# Patient Record
Sex: MALE | Race: WHITE | NOT HISPANIC OR LATINO | URBAN - METROPOLITAN AREA
[De-identification: names, ages, dates, MRNs, and addresses within clinical notes are randomized per-mention and may not be internally consistent; named-entity substitution may affect disease eponyms.]

---

## 2017-06-23 ENCOUNTER — EMERGENCY (EMERGENCY)
Facility: HOSPITAL | Age: 32
LOS: 1 days | Discharge: HOME | End: 2017-06-23
Attending: EMERGENCY MEDICINE | Admitting: EMERGENCY MEDICINE
Payer: COMMERCIAL

## 2017-06-23 VITALS
HEART RATE: 71 BPM | RESPIRATION RATE: 18 BRPM | SYSTOLIC BLOOD PRESSURE: 142 MMHG | DIASTOLIC BLOOD PRESSURE: 93 MMHG | OXYGEN SATURATION: 98 % | TEMPERATURE: 97 F

## 2017-06-23 DIAGNOSIS — Z79.899 OTHER LONG TERM (CURRENT) DRUG THERAPY: ICD-10-CM

## 2017-06-23 DIAGNOSIS — M79.644 PAIN IN RIGHT FINGER(S): ICD-10-CM

## 2017-06-23 DIAGNOSIS — L03.011 CELLULITIS OF RIGHT FINGER: ICD-10-CM

## 2017-06-23 PROCEDURE — 99284 EMERGENCY DEPT VISIT MOD MDM: CPT | Mod: 25

## 2017-06-23 RX ORDER — CEPHALEXIN 500 MG
500 CAPSULE ORAL ONCE
Qty: 0 | Refills: 0 | Status: COMPLETED | OUTPATIENT
Start: 2017-06-23 | End: 2017-06-23

## 2017-06-23 RX ORDER — CEPHALEXIN 500 MG
1 CAPSULE ORAL
Qty: 21 | Refills: 0 | OUTPATIENT
Start: 2017-06-23 | End: 2017-06-30

## 2017-06-23 RX ORDER — CETIRIZINE HYDROCHLORIDE 10 MG/1
0 TABLET ORAL
Qty: 0 | Refills: 0 | COMMUNITY

## 2017-06-23 RX ADMIN — Medication 500 MILLIGRAM(S): at 06:26

## 2017-06-23 NOTE — ED PROVIDER NOTE - OBJECTIVE STATEMENT
5th digit splint 2 days ago, pus today 2 days ago pt got small splinter under fingernail. Today noticed pus come out from under nail. No fever. No other trauma.

## 2017-06-23 NOTE — ED PROVIDER NOTE - CHIEF COMPLAINT
The patient is a 32y Male complaining of pain, finger. The patient is a 32y Male complaining of pain, right 5th finger.

## 2017-06-23 NOTE — ED PROCEDURE NOTE - PROCEDURE ADDITIONAL DETAILS
Focused soft tissue ultrasound of distal right 5th digit  Indication: erythema r/o abscess  No anechoic focus. No foreign body.  Impression: No abscess of finger tip. No evidence of foreign body.  LILLI

## 2017-06-23 NOTE — ED PROVIDER NOTE - SKIN, MLM
Skin normal color for race, warm, dry and intact. Redness right 5th digit fingertip without fluctuance or pus.

## 2017-06-23 NOTE — ED ADULT NURSE NOTE - OBJECTIVE STATEMENT
Pt presents to ED awake, alert and ambulatory, c/o right 5th finger redness, swelling and white discharge. Pt states he thinks he got a splinter yesterday and since has had increased swelling and pain. Denies fever or PMH. Pt states his sister is getting  tomorrow and he was worried that his finger would get worse. Slight redness and swelling noted to distal end of finger surrounding nailbed. No discharge noted at this time.

## 2018-01-13 ENCOUNTER — EMERGENCY (EMERGENCY)
Facility: HOSPITAL | Age: 33
LOS: 1 days | Discharge: HOME | End: 2018-01-13
Attending: EMERGENCY MEDICINE | Admitting: EMERGENCY MEDICINE
Payer: COMMERCIAL

## 2018-01-13 VITALS
TEMPERATURE: 98 F | WEIGHT: 209.44 LBS | SYSTOLIC BLOOD PRESSURE: 151 MMHG | RESPIRATION RATE: 18 BRPM | HEIGHT: 74 IN | HEART RATE: 80 BPM | OXYGEN SATURATION: 99 % | DIASTOLIC BLOOD PRESSURE: 99 MMHG

## 2018-01-13 DIAGNOSIS — S52.121A DISPLACED FRACTURE OF HEAD OF RIGHT RADIUS, INITIAL ENCOUNTER FOR CLOSED FRACTURE: ICD-10-CM

## 2018-01-13 DIAGNOSIS — S09.90XA UNSPECIFIED INJURY OF HEAD, INITIAL ENCOUNTER: ICD-10-CM
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
## 2018-01-13 DIAGNOSIS — M25.521 PAIN IN RIGHT ELBOW: ICD-10-CM
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
## 2018-01-13 DIAGNOSIS — Z79.899 OTHER LONG TERM (CURRENT) DRUG THERAPY: ICD-10-CM
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
## 2018-01-13 DIAGNOSIS — Z79.2 LONG TERM (CURRENT) USE OF ANTIBIOTICS: ICD-10-CM

## 2018-01-13 PROCEDURE — 73502 X-RAY EXAM HIP UNI 2-3 VIEWS: CPT | Mod: 26

## 2018-01-13 PROCEDURE — 73080 X-RAY EXAM OF ELBOW: CPT | Mod: 26,RT

## 2018-01-13 PROCEDURE — 73080 X-RAY EXAM OF ELBOW: CPT | Mod: 26

## 2018-01-13 PROCEDURE — 99285 EMERGENCY DEPT VISIT HI MDM: CPT | Mod: 25

## 2018-01-13 PROCEDURE — 73502 X-RAY EXAM HIP UNI 2-3 VIEWS: CPT | Mod: 26,RT

## 2018-01-13 RX ORDER — IBUPROFEN 200 MG
600 TABLET ORAL ONCE
Qty: 0 | Refills: 0 | Status: COMPLETED | OUTPATIENT
Start: 2018-01-13 | End: 2018-01-13

## 2018-01-13 RX ADMIN — Medication 600 MILLIGRAM(S): at 23:23

## 2018-01-13 NOTE — ED PROVIDER NOTE - PROGRESS NOTE DETAILS
troy: pt received at sign out from dr katz as pending ct head /orbits then dc home - scans neg, pt w/radial head fx and sling in place by dr katz pt placed in sling for radial head fx  x 3-4 weeks-- ortho dr epperson for follow up  3-5 days
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
## 2018-01-13 NOTE — ED PROVIDER NOTE - SHIFT CHANGE DETAILS
await CT head /orbits-- if neg may dc   head injury prec  ortho follow up to dr epperson in 2-3 days- right radial head fx
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
## 2018-01-13 NOTE — ED PROVIDER NOTE - ENMT, MLM
Airway patent, Nasal mucosa clear. Mouth with normal mucosa. Throat has no vesicles, no oropharyngeal exudates and uvula is midline.  pos right inf orbital swelling inf eccyhmosis

## 2018-01-13 NOTE — ED ADULT NURSE NOTE - CHPI ED SYMPTOMS NEG
no decreased eating/drinking/no nausea/no tingling/no dizziness/no numbness/no chills/no vomiting/no weakness/no fever

## 2018-01-14 VITALS
OXYGEN SATURATION: 99 % | HEART RATE: 75 BPM | TEMPERATURE: 98 F | DIASTOLIC BLOOD PRESSURE: 90 MMHG | SYSTOLIC BLOOD PRESSURE: 140 MMHG | RESPIRATION RATE: 18 BRPM

## 2018-01-14 PROCEDURE — 70450 CT HEAD/BRAIN W/O DYE: CPT | Mod: 26

## 2018-01-14 PROCEDURE — 70480 CT ORBIT/EAR/FOSSA W/O DYE: CPT | Mod: 26,59

## 2018-01-14 PROCEDURE — 70450 CT HEAD/BRAIN W/O DYE: CPT

## 2018-01-14 PROCEDURE — 99284 EMERGENCY DEPT VISIT MOD MDM: CPT | Mod: 25

## 2018-01-14 PROCEDURE — 73502 X-RAY EXAM HIP UNI 2-3 VIEWS: CPT

## 2018-01-14 PROCEDURE — 73080 X-RAY EXAM OF ELBOW: CPT

## 2018-01-14 PROCEDURE — 24650 CLTX RDL HEAD/NCK FX WO MNPJ: CPT

## 2018-01-14 PROCEDURE — 70480 CT ORBIT/EAR/FOSSA W/O DYE: CPT

## 2018-01-14 RX ORDER — OXYCODONE AND ACETAMINOPHEN 5; 325 MG/1; MG/1
1 TABLET ORAL ONCE
Qty: 0 | Refills: 0 | Status: DISCONTINUED | OUTPATIENT
Start: 2018-01-14 | End: 2018-01-14

## 2018-01-14 RX ADMIN — OXYCODONE AND ACETAMINOPHEN 1 TABLET(S): 5; 325 TABLET ORAL at 02:07

## 2018-01-14 RX ADMIN — Medication 600 MILLIGRAM(S): at 00:00

## 2018-01-14 NOTE — ED ADULT NURSE REASSESSMENT NOTE - NS ED NURSE REASSESS COMMENT FT1
ALENA Hook at bedside to review CT results and follow up instructions with patient. pt requesting pain medication prior to dc home

## 2018-12-20 NOTE — ED PROVIDER NOTE - CARE PLAN
Pt comes to clinic for follow up anticoagulation visit. Last INR on 12/10 was 3.0. Dose maintained per protocol. Today's INR is 2.2 and is within goal range. Dose maintained per protocol. Teaching: INR result/instructions reviewed with patient. Discussed with patient and will switch to 2.5 mg tablets with next refill for ease of dose adjustments. Dosage reminder sheet given with mg instead of # of pills to help patient transition. Patient was instructed to contact the [facility] with any unusual bleeding or bruising, any changes in medications or over the counter medications, start of antibiotics, changes in diet or health status, any acute illnesses, and if there are any other questions or concerns. Patient verbalized understanding of above.    Warfarin refilled approved per protocol. Switching to 2.5 mg tablets instead of 5 mg tablets for ease of dose adjustments. Prescription eprescribed to [facility] pharmacy. Patient is compliant with INR blood tests as ordered. Last OV with PCP 11/9/18. Current dose of coumadin is 5 mg 3 days a week and 2.5 mg 4 days a week.   
Principal Discharge DX:	Radial head fracture, closed  Secondary Diagnosis:	Closed head injury

## 2019-03-30 NOTE — ED PROVIDER NOTE - NS ED MD DISPO DISCHARGE CCDA
April 1, 2019      Jose Canales  [address] [place]  [place] [facility] [facility] [address]        Dear ,    We are writing to inform you of your test results.      The results from your recent labs have all returned. Your blood sugar is normal.     Your cholesterol is higher than ideal, but not in the range where medication is needed. Improved diet and exercise changes where possible and rechecking this in 1 year is what I recommended. A low fat, carbohydrate-controlled diet should be adopted. Saturated fat should not make up more than 7% of your total daily calories, carbohydrates should be restricted to 50% to 60% of daily calories, and simple sugars like sucrose should be avoided. You may also consider increasing your intake of oily fish by either an increase in your fish intake or a fish oil supplement to at least 2 servings per week. Alcohol should also be limited and at least 30 minutes of aerobic exercise 5 days a week would be beneficial to implement.     Resulted Orders   Lipid panel reflex to direct LDL Fasting   Result Value Ref Range    Cholesterol 175 <200 mg/dL    Triglycerides 140 <150 mg/dL      Comment:      Fasting specimen    HDL Cholesterol 29 (L) >39 mg/dL    LDL Cholesterol Calculated 118 (H) <100 mg/dL      Comment:      Above desirable:  100-129 mg/dl  Borderline High:  130-159 mg/dL  High:             160-189 mg/dL  Very high:       >189 mg/dl      Non HDL Cholesterol 146 (H) <130 mg/dL      Comment:      Above Desirable:  130-159 mg/dl  Borderline high:  160-189 mg/dl  High:             190-219 mg/dl  Very high:       >219 mg/dl     Glucose   Result Value Ref Range    Glucose 85 70 - 99 mg/dL      Comment:      Fasting specimen       If you have any questions or concerns, please call the clinic at the number listed above.       Sincerely,        John Kelley PA-C/AL                
Patient/Caregiver provided printed discharge information.

## 2020-07-08 PROBLEM — Z00.00 ENCOUNTER FOR PREVENTIVE HEALTH EXAMINATION: Status: ACTIVE | Noted: 2020-07-08

## 2020-07-14 ENCOUNTER — APPOINTMENT (OUTPATIENT)
Dept: CARDIOLOGY | Facility: CLINIC | Age: 35
End: 2020-07-14
Payer: COMMERCIAL

## 2020-07-14 ENCOUNTER — NON-APPOINTMENT (OUTPATIENT)
Age: 35
End: 2020-07-14

## 2020-07-14 VITALS
WEIGHT: 214 LBS | TEMPERATURE: 98.9 F | SYSTOLIC BLOOD PRESSURE: 138 MMHG | HEIGHT: 74 IN | BODY MASS INDEX: 27.46 KG/M2 | OXYGEN SATURATION: 96 % | DIASTOLIC BLOOD PRESSURE: 78 MMHG | HEART RATE: 87 BPM

## 2020-07-14 DIAGNOSIS — I10 ESSENTIAL (PRIMARY) HYPERTENSION: ICD-10-CM

## 2020-07-14 DIAGNOSIS — Z82.49 FAMILY HISTORY OF ISCHEMIC HEART DISEASE AND OTHER DISEASES OF THE CIRCULATORY SYSTEM: ICD-10-CM

## 2020-07-14 DIAGNOSIS — Z80.8 FAMILY HISTORY OF MALIGNANT NEOPLASM OF OTHER ORGANS OR SYSTEMS: ICD-10-CM

## 2020-07-14 DIAGNOSIS — Z72.0 TOBACCO USE: ICD-10-CM

## 2020-07-14 PROCEDURE — 99204 OFFICE O/P NEW MOD 45 MIN: CPT

## 2020-07-14 PROCEDURE — 93000 ELECTROCARDIOGRAM COMPLETE: CPT

## 2020-07-14 RX ORDER — CHLORTHALIDONE 25 MG/1
25 TABLET ORAL DAILY
Qty: 90 | Refills: 2 | Status: ACTIVE | COMMUNITY
Start: 2020-07-14

## 2020-07-14 RX ORDER — CETIRIZINE HYDROCHLORIDE 10 MG/1
10 TABLET, FILM COATED ORAL DAILY
Refills: 0 | Status: ACTIVE | COMMUNITY
Start: 2020-07-14

## 2020-07-14 RX ORDER — AMLODIPINE BESYLATE 10 MG/1
10 TABLET ORAL DAILY
Qty: 30 | Refills: 5 | Status: ACTIVE | COMMUNITY
Start: 2020-07-14

## 2020-07-14 RX ORDER — ESCITALOPRAM OXALATE 10 MG/1
10 TABLET ORAL DAILY
Refills: 0 | Status: ACTIVE | COMMUNITY
Start: 2020-07-14

## 2020-07-14 NOTE — HISTORY OF PRESENT ILLNESS
[FreeTextEntry1] : Patient is presenting here today for cardiac evaluation.  The patient is a very pleasant 35-year-old male with a history of hypertension.  He was started on antihypertensive medications in January of this year.  He was followed by his cardiologist.  He was advised to have a baseline cardiac testing which he did not schedule.  He is physically very active.  He exercises on a palatine bike on a regular basis.  He denies any chest pains or shortness of breath.

## 2020-07-14 NOTE — ASSESSMENT
[FreeTextEntry1] : Hypertension appears well-controlled on present medical regimen.  In review of hypertension I recommend echocardiogram to evaluate ejection fraction and rule out LVH.  I do recommend exercise stress test to evaluate exercise capacity but most important to evaluate blood pressure response to exercise to be sure that it is well controlled when he is doing his extreme bilateral exercises.  Primary prevention discussed with the patient.

## 2020-07-14 NOTE — PHYSICAL EXAM
[General Appearance - Well Developed] : well developed [Normal Appearance] : normal appearance [Well Groomed] : well groomed [General Appearance - Well Nourished] : well nourished [No Deformities] : no deformities [General Appearance - In No Acute Distress] : no acute distress [Normal Conjunctiva] : the conjunctiva exhibited no abnormalities [Eyelids - No Xanthelasma] : the eyelids demonstrated no xanthelasmas [Normal Oral Mucosa] : normal oral mucosa [No Oral Pallor] : no oral pallor [No Oral Cyanosis] : no oral cyanosis [Normal Jugular Venous A Waves Present] : normal jugular venous A waves present [Normal Jugular Venous V Waves Present] : normal jugular venous V waves present [No Jugular Venous Perez A Waves] : no jugular venous perez A waves [Heart Sounds] : normal S1 and S2 [Murmurs] : no murmurs present [Heart Rate And Rhythm] : heart rate and rhythm were normal [Respiration, Rhythm And Depth] : normal respiratory rhythm and effort [Exaggerated Use Of Accessory Muscles For Inspiration] : no accessory muscle use [Auscultation Breath Sounds / Voice Sounds] : lungs were clear to auscultation bilaterally [Abdomen Soft] : soft [Abdomen Tenderness] : non-tender [Abnormal Walk] : normal gait [Abdomen Mass (___ Cm)] : no abdominal mass palpated [Gait - Sufficient For Exercise Testing] : the gait was sufficient for exercise testing [Petechial Hemorrhages (___cm)] : no petechial hemorrhages [Cyanosis, Localized] : no localized cyanosis [Nail Clubbing] : no clubbing of the fingernails [Skin Color & Pigmentation] : normal skin color and pigmentation [] : no rash [No Venous Stasis] : no venous stasis [Skin Lesions] : no skin lesions [No Skin Ulcers] : no skin ulcer [Oriented To Time, Place, And Person] : oriented to person, place, and time [No Xanthoma] : no  xanthoma was observed [Mood] : the mood was normal [Affect] : the affect was normal [No Anxiety] : not feeling anxious

## 2020-08-11 ENCOUNTER — APPOINTMENT (OUTPATIENT)
Dept: CARDIOLOGY | Facility: CLINIC | Age: 35
End: 2020-08-11

## 2022-08-23 ENCOUNTER — APPOINTMENT (OUTPATIENT)
Dept: ORTHOPEDIC SURGERY | Facility: CLINIC | Age: 37
End: 2022-08-23

## 2022-08-23 VITALS — BODY MASS INDEX: 29.26 KG/M2 | HEIGHT: 74 IN | WEIGHT: 228 LBS

## 2022-08-23 DIAGNOSIS — M10.072 IDIOPATHIC GOUT, LEFT ANKLE AND FOOT: ICD-10-CM

## 2022-08-23 DIAGNOSIS — M79.672 PAIN IN LEFT FOOT: ICD-10-CM

## 2022-08-23 PROCEDURE — 73610 X-RAY EXAM OF ANKLE: CPT | Mod: LT

## 2022-08-23 PROCEDURE — 99204 OFFICE O/P NEW MOD 45 MIN: CPT

## 2022-08-23 PROCEDURE — L4361: CPT

## 2022-08-23 PROCEDURE — 73630 X-RAY EXAM OF FOOT: CPT | Mod: LT

## 2022-08-23 RX ORDER — COLCHICINE 0.6 MG/1
0.6 TABLET ORAL TWICE DAILY
Qty: 20 | Refills: 0 | Status: ACTIVE | COMMUNITY
Start: 2022-08-23 | End: 1900-01-01

## 2022-08-23 RX ORDER — INDOMETHACIN 50 MG/1
50 CAPSULE ORAL 3 TIMES DAILY
Qty: 20 | Refills: 0 | Status: ACTIVE | COMMUNITY
Start: 2022-08-23 | End: 1900-01-01

## 2022-08-23 NOTE — HISTORY OF PRESENT ILLNESS
[10] : 10 [Dull/Aching] : dull/aching [Sharp] : sharp [Shooting] : shooting [Constant] : constant [Sleep] : sleep [Nothing helps with pain getting better] : Nothing helps with pain getting better [Standing] : standing [Walking] : walking [de-identified] : Mr. DON is a 37 year male who presents today for evaluation of their left foot pain swelling for the past 9 days.  He states that he was diagnosed with gout and was started on a steroid for the past week and a half.  He had started to notice improvements in his pain close to 2 days ago however he again returned to walking on the foot and he does notice moderate to severe pain over the base of the great toe.  He states that the area of swelling and slight redness has not increased or propagated or radiated from the base of the great toe he denies any fever or chills.  He did have blood work done which revealed a uric acid level greater than 8. [] : no [FreeTextEntry1] : Lt foot  [FreeTextEntry5] : Pt here for Lt foot pain , pt was dx with gout 9 days ago , causing pt pain levels on his big toe to be a 10 \par mainly hallux area \par pt was dx by [facility] and his rheumatologist ( dr Recio) \par uric acid levels told to pt when he went to [facility] \par  [de-identified] : 1 week ago  [de-identified] :  rheumatologist ( dr Recio)

## 2022-08-23 NOTE — ASSESSMENT
[FreeTextEntry1] : After their examination today in the office and review of their radiographs I do think the swelling and redness and warmth over the hallux MTP joint is most likely secondary to localized acute gouty arthropathy and synovitis. I would recommend conservative treatment with oral medication including colchicine starting with 1 dose now and then proceeding with twice a day dosing till the pain and swelling and redness have resolved as well as an anti-inflammatory on a daily basis if tolerated. I also recommended protection and immobilization of the midfoot and forefoot as they are quite uncomfortable placing the foot in any shoe and are having difficulty wearing a closed shoe as result of the pain and swelling and sensitivity. I did recommend protection and immobilization for the next few days in a short cam walker boot. Time taken and the cam walker boot was manipulated and fitted to them and demonstrated and taught to them how to apply and remove the boot. They can remove the boot for range of motion exercises throughout the day as well as for sleeping and bathing. I did recommend elevation of the lower extremity. I would like to see them back in 3 to 5 days for repeat clinical examination and we discussed that if the pain or redness increases or if they develop any fever chills, they should present immediately to my office or present to the emergency room\par I also discussed with him that if after a few days on colchicine and indomethacin he does not notice a significant improvement in his pain or discomfort then I would recommend open irrigation and debridement and culturing of the hallux MTP joint
